# Patient Record
Sex: MALE | URBAN - METROPOLITAN AREA
[De-identification: names, ages, dates, MRNs, and addresses within clinical notes are randomized per-mention and may not be internally consistent; named-entity substitution may affect disease eponyms.]

---

## 2024-08-23 ENCOUNTER — OFFICE VISIT (OUTPATIENT)
Dept: FAMILY MEDICINE | Facility: CLINIC | Age: 54
End: 2024-08-23

## 2024-08-23 VITALS
BODY MASS INDEX: 30.02 KG/M2 | DIASTOLIC BLOOD PRESSURE: 92 MMHG | HEIGHT: 66 IN | WEIGHT: 186.8 LBS | OXYGEN SATURATION: 98 % | SYSTOLIC BLOOD PRESSURE: 127 MMHG | HEART RATE: 63 BPM

## 2024-08-23 DIAGNOSIS — Z76.89 ENCOUNTER TO ESTABLISH CARE: Primary | ICD-10-CM

## 2024-08-23 DIAGNOSIS — E78.2 MIXED HYPERLIPIDEMIA: ICD-10-CM

## 2024-08-23 DIAGNOSIS — K21.00 GASTROESOPHAGEAL REFLUX DISEASE WITH ESOPHAGITIS, UNSPECIFIED WHETHER HEMORRHAGE: ICD-10-CM

## 2024-08-23 DIAGNOSIS — R03.0 ELEVATED BLOOD PRESSURE READING WITHOUT DIAGNOSIS OF HYPERTENSION: ICD-10-CM

## 2024-08-23 DIAGNOSIS — Z23 NEED FOR VACCINATION: ICD-10-CM

## 2024-08-23 LAB
ALBUMIN SERPL BCG-MCNC: 4.6 G/DL (ref 3.5–5.2)
ALP SERPL-CCNC: 71 U/L (ref 40–150)
ALT SERPL W P-5'-P-CCNC: 42 U/L (ref 0–70)
ANION GAP SERPL CALCULATED.3IONS-SCNC: 9 MMOL/L (ref 7–15)
AST SERPL W P-5'-P-CCNC: 27 U/L (ref 0–45)
BILIRUB SERPL-MCNC: 0.5 MG/DL
BUN SERPL-MCNC: 13.7 MG/DL (ref 6–20)
CALCIUM SERPL-MCNC: 9.2 MG/DL (ref 8.8–10.4)
CHLORIDE SERPL-SCNC: 106 MMOL/L (ref 98–107)
CHOLESTEROL: 194 MG/DL (ref 100–199)
CREAT SERPL-MCNC: 1.01 MG/DL (ref 0.67–1.17)
EGFRCR SERPLBLD CKD-EPI 2021: 88 ML/MIN/1.73M2
FASTING STATUS PATIENT QL REPORTED: YES
FASTING?: YES
GLUCOSE SERPL-MCNC: 112 MG/DL (ref 70–99)
HCO3 SERPL-SCNC: 25 MMOL/L (ref 22–29)
HDL (RMG): 46 MG/DL (ref 40–?)
LDL CALCULATED (RMG): 120 MG/DL (ref 0–130)
POTASSIUM SERPL-SCNC: 4.1 MMOL/L (ref 3.4–5.3)
PROT SERPL-MCNC: 7.1 G/DL (ref 6.4–8.3)
SODIUM SERPL-SCNC: 140 MMOL/L (ref 135–145)
TRIGLYCERIDES (RMG): 134 MG/DL (ref 0–149)

## 2024-08-23 PROCEDURE — 90715 TDAP VACCINE 7 YRS/> IM: CPT

## 2024-08-23 PROCEDURE — 99214 OFFICE O/P EST MOD 30 MIN: CPT | Mod: 25

## 2024-08-23 PROCEDURE — 90471 IMMUNIZATION ADMIN: CPT

## 2024-08-23 PROCEDURE — 36415 COLL VENOUS BLD VENIPUNCTURE: CPT

## 2024-08-23 PROCEDURE — 80061 LIPID PANEL: CPT | Mod: QW

## 2024-08-23 PROCEDURE — 80053 COMPREHEN METABOLIC PANEL: CPT | Mod: ORL

## 2024-08-23 RX ORDER — PRAVASTATIN SODIUM 40 MG
60 TABLET ORAL DAILY
COMMUNITY
End: 2024-08-23

## 2024-08-23 RX ORDER — PRAVASTATIN SODIUM 40 MG
60 TABLET ORAL DAILY
Qty: 135 TABLET | Refills: 1 | Status: SHIPPED | OUTPATIENT
Start: 2024-08-23

## 2024-08-23 NOTE — PROGRESS NOTES
"  Assessment & Plan     Encounter to establish care  Review patients past medical and surgical history. Discussed establishing care at clinic. Patient's questions answered.     Mixed hyperlipidemia  Taking Pravastatin 60 mg daily. Stable. No side effects. Will recheck lipid profile today.   - pravastatin (PRAVACHOL) 40 MG tablet  Dispense: 135 tablet; Refill: 1  - Lipid Profile ([facility])  - VENOUS COLLECTION  - Comprehensive metabolic panel  - Comprehensive metabolic panel    Gastroesophageal reflux disease with esophagitis, unspecified whether hemorrhage  Taking Omeprazole 20 mg daily. Stable/controlled. Continue current medication regimen.  - omeprazole (PRILOSEC) 20 MG DR capsule  Dispense: 90 capsule; Refill: 1    Need for vaccination  - TDAP 7+ (ADACEL,BOOSTRIX)  - VACCINE ADMINISTRATION, INITIAL    Elevated blood pressure reading without diagnosis of hypertension  Blood pressure was elevated at today's visit. Recommendations include monitoring blood pressures over the next 1-2 weeks and if blood pressure continues to be elevated over 140/90 recommend follow up for a blood pressure management visit. Discussed importance of a healthy weight, along with diet and exercise. Patient agreeable to plan.             BMI  Estimated body mass index is 29.92 kg/m  as calculated from the following:    Height as of this encounter: 1.683 m (5' 6.25\").    Weight as of this encounter: 84.7 kg (186 lb 12.8 oz).   Weight management plan: Discussed healthy diet and exercise guidelines      Work on weight loss  Regular exercise  See Patient Instructions    Return for Follow up, Routine preventive.    The longitudinal plan of care for the diagnosis(es)/condition(s) as documented were addressed during this visit. Due to the added complexity in care, I will continue to support Romulo in the subsequent management and with ongoing continuity of care.    Subjective   Romulo is a 53 year old, presenting for the following health issues:  Establish " "Care (Pt previously seen at: was living in [place] last year (seen by [facility]), then [place], now living in [place] again in [place]/), Recheck Medication (Mostly needs to get rxs- able to send prescription across states??), Health Maintenance (Tdap- last Td 2003, due/Zoster- never done /Colonoscopy- done years ago and normal, then did cologuard last year, normal), and Blood Draw (Fasting )    HPI     Living in [facility] then to [facility]. Has a place up in [place] and will stay in [place] as well. Send prescriptions to [place] to mail from [facility].    HLD: Pravastatin taking 60 mg daily. No side effects.     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      Objective    BP (!) 127/92   Pulse 63   Ht 1.683 m (5' 6.25\")   Wt 84.7 kg (186 lb 12.8 oz)   SpO2 98%   BMI 29.92 kg/m    Body mass index is 29.92 kg/m .  Physical Exam   GENERAL: alert and no distress  RESP: lungs clear to auscultation - no rales, rhonchi or wheezes  CV: regular rate and rhythm, normal S1 S2, no S3 or S4, no murmur, click or rub, no peripheral edema  MS: no gross musculoskeletal defects noted, no edema  PSYCH: mentation appears normal, affect normal/bright    Results for orders placed or performed in visit on 08/23/24 (from the past 24 hour(s))   Lipid Profile ([facility])   Result Value Ref Range    Cholesterol 194 100 - 199 mg/dL    HDL 46 40 mg/dL    Triglycerides 134 0 - 149 mg/dL    LDL CALCULATED ([facility]) 120 0 - 130 mg/dL    Patient Fasting? Yes            Signed Electronically by: LUANA Barr CNP    "

## 2024-10-13 ENCOUNTER — HEALTH MAINTENANCE LETTER (OUTPATIENT)
Age: 54
End: 2024-10-13

## 2025-04-29 DIAGNOSIS — E78.2 MIXED HYPERLIPIDEMIA: ICD-10-CM

## 2025-04-29 DIAGNOSIS — K21.00 GASTROESOPHAGEAL REFLUX DISEASE WITH ESOPHAGITIS, UNSPECIFIED WHETHER HEMORRHAGE: ICD-10-CM

## 2025-04-29 RX ORDER — PRAVASTATIN SODIUM 40 MG
60 TABLET ORAL DAILY
Qty: 135 TABLET | Refills: 1 | Status: SHIPPED | OUTPATIENT
Start: 2025-04-29

## 2025-04-29 RX ORDER — OMEPRAZOLE 20 MG/1
20 CAPSULE, DELAYED RELEASE ORAL DAILY
Qty: 90 CAPSULE | Refills: 1 | Status: SHIPPED | OUTPATIENT
Start: 2025-04-29

## 2025-04-29 NOTE — TELEPHONE ENCOUNTER
Med: Pravastatin    LOV (related): 8/23/24    Last Lab: 8/23/24      Due for F/U around: 5/2025    Next Appt: 5/15/2025        Cholesterol   Date Value Ref Range Status   08/23/2024 194 100 - 199 mg/dL Final   03/25/2003 228 (H) <200 mg/dL Final     Comment:     Cholesterol Reference Range:   <200  The NCEP recommends further         evaluation of:         1.  Patients with cholesterol             greater than 200 mg/dL             if additional risk factors             are present.         2.  All patients with a             cholesterol greater than             240 mg/dL.     HDL Cholesterol   Date Value Ref Range Status   03/25/2003 63 >40 mg/dL Final     HDL   Date Value Ref Range Status   08/23/2024 46 >=40 mg/dL Final     LDL Cholesterol Calculated   Date Value Ref Range Status   03/25/2003 146 (H) <130 mg/dL Final     LDL CALCULATED ([facility])   Date Value Ref Range Status   08/23/2024 120 0 - 130 mg/dL Final     Triglycerides   Date Value Ref Range Status   08/23/2024 134 0 - 149 mg/dL Final   03/25/2003 94 <150 mg/dL Final     Cholesterol/HDL Ratio   Date Value Ref Range Status   03/25/2003 4 0 - 5 Final        Med: Omeprazole    LOV (related): 8/23/24      Due for F/U around: 5/2025    Next Appt: 5/15/2025

## 2025-05-16 PROCEDURE — 80053 COMPREHEN METABOLIC PANEL: CPT | Mod: ORL

## 2025-05-16 PROCEDURE — 86803 HEPATITIS C AB TEST: CPT | Mod: ORL

## 2025-05-16 PROCEDURE — 87389 HIV-1 AG W/HIV-1&-2 AB AG IA: CPT | Mod: ORL

## 2025-05-19 ENCOUNTER — RESULTS FOLLOW-UP (OUTPATIENT)
Dept: FAMILY MEDICINE | Facility: CLINIC | Age: 55
End: 2025-05-19

## 2025-09-03 DIAGNOSIS — K21.00 GASTROESOPHAGEAL REFLUX DISEASE WITH ESOPHAGITIS, UNSPECIFIED WHETHER HEMORRHAGE: ICD-10-CM

## 2025-09-03 DIAGNOSIS — E78.2 MIXED HYPERLIPIDEMIA: ICD-10-CM

## 2025-09-04 RX ORDER — PRAVASTATIN SODIUM 40 MG
60 TABLET ORAL DAILY
Qty: 135 TABLET | Refills: 2 | Status: SHIPPED | OUTPATIENT
Start: 2025-09-04

## 2025-09-04 RX ORDER — OMEPRAZOLE 20 MG/1
20 CAPSULE, DELAYED RELEASE ORAL DAILY
Qty: 90 CAPSULE | Refills: 2 | Status: SHIPPED | OUTPATIENT
Start: 2025-09-04